# Patient Record
(demographics unavailable — no encounter records)

---

## 2025-03-12 NOTE — ASSESSMENT
[Bisphosphonate Therapy] : Risks and benefits of bisphosphonate therapy were  discussed with the patient including gastroesophageal irritation, osteonecrosis of the jaw, and atypical femur fractures, and acute phase reaction [Bisphosphonates] : The patient was instructed to take bisphosphonates on an empty stomach with a full glass of water,and wait at least 30 minutes before eating or lying down [FreeTextEntry1] :  64 y/o female returns for a follow-up visit for osteoporosis.  Pt presents with moderately low bone density consistent with osteoporosis in spine and osteopenia in hip region.  Bone density appears fairly stable to prior study 2019.  The patient was treated for some period of time on raloxifene 1-2 years but has not been on no recent medications.  She was interested in more aggressive therapy due to recent traumatic rib fractures. Began Risedronate 150 mg monthly did not tolerate due to myalgias.  Pt transitioned to Ibandronate 07/2023, taking correctly tolerating well No UGI sx. No thigh pain. BMD 03/2024 indicates stable osteopenia in the spine, osteopenia in hips with small change most likely due to change in machine, osteopenia in prox. radius.  BMD 03/2025 indicates stable osteoporosis in the spine, stable osteopenia in hips and prox. radius. BMD results reviewed w/pt. C/w Ibandronate.   Call Dr. Salomon Barrios for labs   F/u in 1 year and repeat BMD

## 2025-03-12 NOTE — HISTORY OF PRESENT ILLNESS
[FreeTextEntry1] :  Pt returns for a follow-up visit for osteoporosis. Pt began Ibandronate 07/2023. Pt states she stopped heavily drinking 10/2024. No major surgeries, hospitalizations, fractures or changes in medication. Up to date with dentist. No major dental work planned.  Pt presents with moderately low bone density consistent with osteoporosis in spine and osteopenia in hip region.  Bone density appears fairly stable to prior study 2019.  The patient was treated for some period of time on raloxifene 1-2 years but has not been on no recent medications.  She was interested in more aggressive therapy due to recent traumatic rib fractures. Began Risedronate 150 mg monthly did not tolerate due to myalgias.  Pt transitioned to Ibandronate 07/2023, taking correctly tolerating well No UGI sx. No thigh pain. BMD 03/2024 indicates stable osteopenia in the spine, osteopenia in hips with small change most likely due to change in machine, osteopenia in prox. radius. BMD results reviewed w/pt. BMD 03/2025 indicates stable osteoporosis in the spine, stable osteopenia in hips and prox. radius. BMD results reviewed w/pt.  Patient is post-menopausal since age 50. No HRT.  Pt has no Hx of kidney stones or calcium issues. No Hx of anorexia nervosa, premature menopause, amenorrhea during reproductive years. No h/o celiac disease, malabsorption, nutritional deficiencies. . No ulcers or bleeding. No other unusual risks for osteoporosis such as FHx hip fracture, suggestion of OI. No Hx of RT. No chronic prednisone use. No Hx of Paget's disease. No Hx of DVT or PE. Up to date with routine care.   Patient reports more than 3 alcoholic beverages per day on average.  Of note is family history of alcoholism. Pt states she stopped heavily drinking 10/2024.   She is being followed for an abnormal mammogram but is not required breast biopsies.

## 2025-03-12 NOTE — PHYSICAL EXAM
[Alert] : alert [Well Nourished] : well nourished [No Acute Distress] : no acute distress [Well Developed] : well developed [Normal Sclera/Conjunctiva] : normal sclera/conjunctiva [No Proptosis] : no proptosis [Normal Oropharynx] : the oropharynx was normal [Thyroid Not Enlarged] : the thyroid was not enlarged [No Thyroid Nodules] : no palpable thyroid nodules [Clear to Auscultation] : lungs were clear to auscultation bilaterally [Normal Rate] : heart rate was normal [Regular Rhythm] : with a regular rhythm [No Edema] : no peripheral edema [Normal Bowel Sounds] : normal bowel sounds [Not Tender] : non-tender [Not Distended] : not distended [Soft] : abdomen soft [Normal Anterior Cervical Nodes] : no anterior cervical lymphadenopathy [No Spinal Tenderness] : no spinal tenderness [Spine Straight] : spine straight [No Stigmata of Cushings Syndrome] : no stigmata of Cushings Syndrome [Normal Gait] : normal gait [Normal Reflexes] : deep tendon reflexes were 2+ and symmetric [Oriented x3] : oriented to person, place, and time [Normal Rate and Effort] : normal respiratory rate and effort [No Murmurs] : no murmurs [de-identified] : Fine tremor

## 2025-03-12 NOTE — PROCEDURE
[FreeTextEntry1] : Bone Mineral Density: 03/12/2025 Indication: Comparison to 2024, assess response to medication Spine: -2.6, osteoporosis, no significant change. Total hip: -2.1, osteopenia, no significant change Femoral neck: -2.0, osteopenia, +3.5% not statistically significant  Proximal radius: -2.2, osteopenia, no significant change  Bone mineral density March 12, 2024 Compared to outside study 2022 Spine -2.4 osteopenia prior report -2.5 Total hip -2.1 osteopenia prior report -1.3.  Images not available  femoral neck -2.2 osteopenia prior report -1.8 images not available Proximal radius -2.1 osteopenia no prior

## 2025-03-12 NOTE — PHYSICAL EXAM
[Alert] : alert [Well Nourished] : well nourished [No Acute Distress] : no acute distress [Well Developed] : well developed [Normal Sclera/Conjunctiva] : normal sclera/conjunctiva [No Proptosis] : no proptosis [Normal Oropharynx] : the oropharynx was normal [Thyroid Not Enlarged] : the thyroid was not enlarged [No Thyroid Nodules] : no palpable thyroid nodules [Clear to Auscultation] : lungs were clear to auscultation bilaterally [Normal Rate] : heart rate was normal [Regular Rhythm] : with a regular rhythm [No Edema] : no peripheral edema [Normal Bowel Sounds] : normal bowel sounds [Not Tender] : non-tender [Not Distended] : not distended [Soft] : abdomen soft [Normal Anterior Cervical Nodes] : no anterior cervical lymphadenopathy [No Spinal Tenderness] : no spinal tenderness [Spine Straight] : spine straight [No Stigmata of Cushings Syndrome] : no stigmata of Cushings Syndrome [Normal Gait] : normal gait [Normal Reflexes] : deep tendon reflexes were 2+ and symmetric [Oriented x3] : oriented to person, place, and time [Normal Rate and Effort] : normal respiratory rate and effort [No Murmurs] : no murmurs [de-identified] : Fine tremor

## 2025-03-12 NOTE — END OF VISIT
[FreeTextEntry3] :  This note was written by Irma Rodgers on (March 12, 2025) acting as a medical scribe for Dr. Ontiveros This note was authored by the medical scribe for me. I have reviewed, edited, and revised the note as needed. I am in agreement with the exam findings, imaging findings, and treatment plan.  Nito Ontiveros MD